# Patient Record
Sex: FEMALE
[De-identification: names, ages, dates, MRNs, and addresses within clinical notes are randomized per-mention and may not be internally consistent; named-entity substitution may affect disease eponyms.]

---

## 2020-03-09 ENCOUNTER — APPOINTMENT (OUTPATIENT)
Dept: ORTHOPEDIC SURGERY | Facility: CLINIC | Age: 55
End: 2020-03-09

## 2020-03-09 PROBLEM — Z00.00 ENCOUNTER FOR PREVENTIVE HEALTH EXAMINATION: Status: ACTIVE | Noted: 2020-03-09

## 2020-03-10 ENCOUNTER — APPOINTMENT (OUTPATIENT)
Dept: ORTHOPEDIC SURGERY | Facility: CLINIC | Age: 55
End: 2020-03-10
Payer: COMMERCIAL

## 2020-03-10 PROCEDURE — 99204 OFFICE O/P NEW MOD 45 MIN: CPT

## 2020-03-10 PROCEDURE — 73562 X-RAY EXAM OF KNEE 3: CPT | Mod: 50

## 2020-03-10 NOTE — REASON FOR VISIT
[Initial Visit] : an initial visit for [FreeTextEntry2] : LEFT KNEE PAIN - FELL IN SUBWAY ON 02/07/20 - SENT FOR NEW XRAYS -

## 2020-03-10 NOTE — HISTORY OF PRESENT ILLNESS
[de-identified] : Patient is here status post fall injuring both left knee and left shoulder. Patient states she has difficulty with overhead activities and difficulty with positions such as putting on a coat or laura. The  left knee is concerned his difficulty with stair climbing the knee feels swollen and warm to her. She is difficulty with stair climbing and getting out of a seated position. Both symptoms started at [facility] fall approximately a month ago.

## 2020-03-10 NOTE — DISCUSSION/SUMMARY
[Medication Risks Reviewed] : Medication risks reviewed [de-identified] : Patient placed on Celebrex 200 mg p.o. twice a day for 6 day course. She will return in 10 days to 2 weeks if not improved for possible cortisone injection the left knee and maybe subacromial injection of the shoulder.

## 2020-03-10 NOTE — PHYSICAL EXAM
[de-identified] : Left shoulder patient has difficulty with overhead activities positive impingement sign decreased internal rotation due to discomfort. The cuff strength with testing as was a total duration of adduction she is neurovascular intact distally.\par \par Left knee definite medial and lateral joint line tenderness positive°. His soft tissue swelling and warmth. Knee is stable. Range of motion 0-125°. [de-identified] : AP standing individual lateral sunrise views obtained showing moderate dimension of medial joint space on standing AP projection of the left knee.

## 2020-03-13 RX ORDER — MELOXICAM 15 MG/1
15 TABLET ORAL
Qty: 30 | Refills: 2 | Status: ACTIVE | COMMUNITY
Start: 2020-03-13 | End: 1900-01-01

## 2020-03-24 ENCOUNTER — APPOINTMENT (OUTPATIENT)
Dept: ORTHOPEDIC SURGERY | Facility: CLINIC | Age: 55
End: 2020-03-24